# Patient Record
Sex: MALE | Race: BLACK OR AFRICAN AMERICAN
[De-identification: names, ages, dates, MRNs, and addresses within clinical notes are randomized per-mention and may not be internally consistent; named-entity substitution may affect disease eponyms.]

---

## 2019-06-13 ENCOUNTER — HOSPITAL ENCOUNTER (OUTPATIENT)
Dept: HOSPITAL 74 - JASU-SURG | Age: 59
Discharge: HOME | End: 2019-06-13
Attending: ORTHOPAEDIC SURGERY
Payer: COMMERCIAL

## 2019-06-13 VITALS — SYSTOLIC BLOOD PRESSURE: 130 MMHG | HEART RATE: 52 BPM | DIASTOLIC BLOOD PRESSURE: 80 MMHG

## 2019-06-13 VITALS — BODY MASS INDEX: 27.3 KG/M2

## 2019-06-13 VITALS — TEMPERATURE: 97.6 F
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
## 2019-06-13 DIAGNOSIS — S86.011A: Primary | ICD-10-CM
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
## 2019-06-13 PROCEDURE — 0LQN0ZZ REPAIR RIGHT LOWER LEG TENDON, OPEN APPROACH: ICD-10-PCS | Performed by: ORTHOPAEDIC SURGERY

## 2019-06-13 NOTE — OP
Operative Note





- Note:


Operative Date: 06/13/19 ([facility])


Pre-Operative Diagnosis: right achilles tendon rupture


Operation: right achilles tendon repair


Post-Operative Diagnosis: Same as Pre-op


Surgeon: Meir Montilla


Assistant: Dru Carney


Anesthesiologist/CRNA: Leventhal,Joshua


Anesthesia: Spinal, Local


Estimated Blood Loss (mls): 50


Operative Report Dictated: Yes

## 2019-06-13 NOTE — HP
[facility]





- Chief Complaint


History of Present Illness: right leg pain





- Past Medical History


Allergies/Adverse Reactions: 


 Allergies











Allergy/AdvReac Type Severity Reaction Status Date / Time


 


No Known Allergies Allergy   Verified 06/13/19 06:22














- Current Medications


Current Medications: 


 Home Medications











 Medication  Instructions  Recorded


 


NK [No Known Home Medication]  06/10/19














Satellite Physical Exam





- Physical Examination


Vital Signs: 


 Vital Signs











 Period  Temp  Pulse  Resp  BP Sys/Sanders  Pulse Ox


 


 Last 24 Hr  98.1 F-98.1 F  58-58  20-20  144-144/80-80  97











Extremities: Other (+ gap in right achilles, + Avitia sign)





Satellite Impression/Plan





- Impression/Plan


Impression: right achilles tendon rupture


Operative Procedure: right achilles tendon rupture


Date to be Performed: 06/13/19

## 2019-06-13 NOTE — OP
DATE OF OPERATION:  06/13/2019

 

PREOPERATIVE DIAGNOSIS:  Right Achilles tendon rupture.

 

POSTOPERATIVE DIAGNOSIS:  Right Achilles tendon rupture.

 

PROCEDURE:  Right Achilles tendon repair.

 

SURGICAL ATTENDING:  Meir Montilla MD

 

FIRST ASSISTANT:  CHELO López

 

ANESTHESIA:  Spinal.

 

CLOSURE:  SutureTape suture for tendon, 2-0 Vicryl subcutaneous, staples for skin.

 

ESTIMATED BLOOD LOSS:  Negligible.

 

COMPLICATIONS:  None.

 

CONDITION:  Recovery in stable condition.

 

DESCRIPTION OF OPERATIVE PROCEDURE:  Patient was taken to the operating room on June 13, 2019.  Spinal anesthesia was administered by the anesthesiologist.  IV Kefzol was

administered prophylactically prior to the case.  Patient was placed in the prone

position with all prominences well padded.  The right lower extremity was prepped and

draped in the usual sterile fashion.  A 6- to 8-cm curvilinear incision over the

Achilles tendon was incised.  Full-thickness dissection was carried down to the level

of the tendon.  The 2 stumps of the tendon were easily mobilized both proximally and

distally.  SutureTape suture was weaved up and down the sides of the tendon, two on

the proximal and two on the distal end.  A fasciotomy was performed at the anterior

aspect of the tendons _____ and blood supply for later repair.  With the foot in

equinus, the 2 ends were sutured one to another, achieving excellent end-to-end

repair.  The plantaris tendon was found to be intact and was weaved into the repair

as well.  Incision was irrigated copiously.  The subcutaneous was closed with 2-0

Vicryl, and staples were used for the skin.  Sterile pressure dressing was applied,

followed by an Equinus splint.  Patient was awakened from anesthesia and transferred

to recovery room in stable condition.  No complications.

 

ESTIMATED BLOOD LOSS:  Negligible.

 

COMPLICATIONS:  None.

 

 

MEIR MONTILLA M.D.

 

[id number]

DD: 06/13/2019 08:48

DT: 06/13/2019 09:18

Job #:  [id number]

## 2022-07-09 ENCOUNTER — HOSPITAL ENCOUNTER (EMERGENCY)
Dept: HOSPITAL 74 - FER | Age: 62
Discharge: HOME | End: 2022-07-09
Payer: COMMERCIAL

## 2022-07-09 VITALS — TEMPERATURE: 99.5 F

## 2022-07-09 VITALS — DIASTOLIC BLOOD PRESSURE: 90 MMHG | HEART RATE: 89 BPM | SYSTOLIC BLOOD PRESSURE: 127 MMHG

## 2022-07-09 VITALS — BODY MASS INDEX: 27.3 KG/M2

## 2022-07-09 DIAGNOSIS — L03.818: Primary | ICD-10-CM

## 2022-07-09 LAB
ALBUMIN SERPL-MCNC: 3.7 G/DL (ref 3.4–5)
ALP SERPL-CCNC: 74 U/L (ref 45–117)
ALT SERPL-CCNC: 132 U/L (ref 13–61)
ANION GAP SERPL CALC-SCNC: 12 MMOL/L (ref 8–16)
AST SERPL-CCNC: 130 U/L (ref 15–37)
BILIRUB SERPL-MCNC: 1.5 MG/DL (ref 0.2–1)
BUN SERPL-MCNC: 15 MG/DL (ref 7–18)
CALCIUM SERPL-MCNC: 8.9 MG/DL (ref 8.5–10)
CHLORIDE SERPL-SCNC: 94 MMOL/L (ref 98–107)
CO2 SERPL-SCNC: 26 MMOL/L (ref 21–32)
CREAT SERPL-MCNC: 1.5 MG/DL (ref 0.55–1.3)
DEPRECATED RDW RBC AUTO: 15.2 % (ref 11.9–15.9)
GLUCOSE SERPL-MCNC: 126 MG/DL (ref 74–106)
HCT VFR BLD CALC: 41.6 % (ref 35.4–49)
HGB BLD-MCNC: 14.8 G/DL (ref 11.7–16.9)
MCH RBC QN AUTO: 29.6 PG (ref 25.7–33.7)
MCHC RBC AUTO-ENTMCNC: 35.5 G/DL (ref 32–35.9)
MCV RBC: 83.6 FL (ref 80–96)
PLATELET # BLD AUTO: 141.7 10^3/UL (ref 134–434)
PMV BLD: 9.2 FL (ref 7.5–11.1)
PROT SERPL-MCNC: 7.3 G/DL (ref 6.4–8.2)
RBC # BLD AUTO: 4.98 10^6/UL (ref 4–5.6)
SODIUM SERPL-SCNC: 132 MMOL/L (ref 136–145)
WBC # BLD AUTO: 5.6 10^3/UL (ref 4–10.8)